# Patient Record
Sex: FEMALE | Race: WHITE | URBAN - METROPOLITAN AREA
[De-identification: names, ages, dates, MRNs, and addresses within clinical notes are randomized per-mention and may not be internally consistent; named-entity substitution may affect disease eponyms.]

---

## 2021-04-28 ENCOUNTER — APPOINTMENT (RX ONLY)
Dept: URBAN - METROPOLITAN AREA CLINIC 133 | Facility: CLINIC | Age: 66
Setting detail: DERMATOLOGY
End: 2021-04-28

## 2021-04-28 DIAGNOSIS — L57.0 ACTINIC KERATOSIS: ICD-10-CM

## 2021-04-28 DIAGNOSIS — L82.1 OTHER SEBORRHEIC KERATOSIS: ICD-10-CM

## 2021-04-28 DIAGNOSIS — L81.4 OTHER MELANIN HYPERPIGMENTATION: ICD-10-CM

## 2021-04-28 DIAGNOSIS — L91.8 OTHER HYPERTROPHIC DISORDERS OF THE SKIN: ICD-10-CM

## 2021-04-28 PROBLEM — D48.5 NEOPLASM OF UNCERTAIN BEHAVIOR OF SKIN: Status: ACTIVE | Noted: 2021-04-28

## 2021-04-28 PROCEDURE — ? ADDITIONAL NOTES

## 2021-04-28 PROCEDURE — 99203 OFFICE O/P NEW LOW 30 MIN: CPT | Mod: 25

## 2021-04-28 PROCEDURE — 11102 TANGNTL BX SKIN SINGLE LES: CPT | Mod: 59

## 2021-04-28 PROCEDURE — 11103 TANGNTL BX SKIN EA SEP/ADDL: CPT

## 2021-04-28 PROCEDURE — ? BIOPSY BY SHAVE METHOD

## 2021-04-28 PROCEDURE — 67810 INCAL BX EYELID SKN LID MRGN: CPT

## 2021-04-28 PROCEDURE — 17000 DESTRUCT PREMALG LESION: CPT | Mod: 59

## 2021-04-28 PROCEDURE — 17003 DESTRUCT PREMALG LES 2-14: CPT

## 2021-04-28 PROCEDURE — ? LIQUID NITROGEN

## 2021-04-28 PROCEDURE — ? COUNSELING

## 2021-04-28 ASSESSMENT — LOCATION SIMPLE DESCRIPTION DERM
LOCATION SIMPLE: CHEST
LOCATION SIMPLE: LEFT ANTERIOR NECK
LOCATION SIMPLE: LEFT UPPER ARM
LOCATION SIMPLE: RIGHT FOREARM
LOCATION SIMPLE: LEFT LATERAL SUPERIOR TARSAL REGION
LOCATION SIMPLE: RIGHT UPPER ARM

## 2021-04-28 ASSESSMENT — LOCATION DETAILED DESCRIPTION DERM
LOCATION DETAILED: LEFT ANTERIOR DISTAL UPPER ARM
LOCATION DETAILED: RIGHT ANTERIOR DISTAL UPPER ARM
LOCATION DETAILED: LEFT INFERIOR LATERAL NECK
LOCATION DETAILED: RIGHT MEDIAL SUPERIOR CHEST
LOCATION DETAILED: RIGHT VENTRAL PROXIMAL FOREARM
LOCATION DETAILED: LEFT ANTERIOR LATERAL DISTAL UPPER ARM
LOCATION DETAILED: RIGHT ANTECUBITAL SKIN
LOCATION DETAILED: LEFT MEDIAL SUPERIOR CHEST
LOCATION DETAILED: LEFT ANTECUBITAL SKIN
LOCATION DETAILED: RIGHT DISTAL DORSAL FOREARM
LOCATION DETAILED: LEFT LATERAL SUPERIOR TARSAL REGION

## 2021-04-28 ASSESSMENT — LOCATION ZONE DERM
LOCATION ZONE: EYELID
LOCATION ZONE: TRUNK
LOCATION ZONE: NECK
LOCATION ZONE: ARM

## 2021-04-28 NOTE — PROCEDURE: LIQUID NITROGEN
Render Post-Care Instructions In Note?: no
Consent: The patient's consent was obtained including but not limited to risks of crusting, scabbing, blistering, scarring, darker or lighter pigmentary change, recurrence, incomplete removal and infection.
Post-Care Instructions: I reviewed with the patient in detail post-care instructions. Patient is to wear sunprotection, and avoid picking at any of the treated lesions. Pt may apply Vaseline to crusted or scabbing areas.
Detail Level: Simple
Number Of Freeze-Thaw Cycles: 2 freeze-thaw cycles
Duration Of Freeze Thaw-Cycle (Seconds): 3

## 2021-04-28 NOTE — PROCEDURE: MIPS QUALITY
Detail Level: Generalized
Quality 226: Preventive Care And Screening: Tobacco Use: Screening And Cessation Intervention: Patient screened for tobacco use, is a smoker AND did not received Cessation Counseling for Medical Reasons
Quality 111:Pneumonia Vaccination Status For Older Adults: Pneumococcal Vaccination Previously Received
Quality 431: Preventive Care And Screening: Unhealthy Alcohol Use - Screening: Patient screened for unhealthy alcohol use using a single question and scores less than 2 times per year
Quality 130: Documentation Of Current Medications In The Medical Record: Current Medications Documented
Quality 110: Preventive Care And Screening: Influenza Immunization: Influenza Immunization Administered during Influenza season

## 2021-04-28 NOTE — PROCEDURE: BIOPSY BY SHAVE METHOD
Detail Level: Detailed
Depth Of Biopsy: dermis
Was A Bandage Applied: Yes
Size Of Lesion In Cm: 0
Biopsy Type: H and E
Biopsy Method: double edge Personna blade
Anesthesia Type: 1% lidocaine with epinephrine
Anesthesia Volume In Cc (Will Not Render If 0): 0.5
Hemostasis: Randall's
Wound Care: Polysporin ointment
Dressing: bandage
Destruction After The Procedure: No
Type Of Destruction Used: Curettage
Curettage Text: The wound bed was treated with curettage after the biopsy was performed.
Cryotherapy Text: The wound bed was treated with cryotherapy after the biopsy was performed.
Electrodesiccation Text: The wound bed was treated with electrodesiccation after the biopsy was performed.
Electrodesiccation And Curettage Text: The wound bed was treated with electrodesiccation and curettage after the biopsy was performed.
Silver Nitrate Text: The wound bed was treated with silver nitrate after the biopsy was performed.
Lab: [id number]
Lab Facility: [id number]
Consent: Written consent was obtained and risks were reviewed including but not limited to scarring, infection, bleeding, scabbing, incomplete removal, nerve damage and allergy to anesthesia.
Post-Care Instructions: I reviewed with the patient in detail post-care instructions. Patient is to keep the biopsy site dry overnight, and then apply polysporin twice daily until healed.
Notification Instructions: Patient will be notified of biopsy results. However, patient instructed to call the office if not contacted within 2 weeks.
Billing Type: Third-Party Bill
Information: Selecting Yes will display possible errors in your note based on the variables you have selected. This validation is only offered as a suggestion for you. PLEASE NOTE THAT THE VALIDATION TEXT WILL BE REMOVED WHEN YOU FINALIZE YOUR NOTE. IF YOU WANT TO FAX A PRELIMINARY NOTE YOU WILL NEED TO TOGGLE THIS TO 'NO' IF YOU DO NOT WANT IT IN YOUR FAXED NOTE.

## 2021-05-26 ENCOUNTER — APPOINTMENT (RX ONLY)
Dept: URBAN - METROPOLITAN AREA CLINIC 133 | Facility: CLINIC | Age: 66
Setting detail: DERMATOLOGY
End: 2021-05-26

## 2021-05-26 DIAGNOSIS — L91.8 OTHER HYPERTROPHIC DISORDERS OF THE SKIN: ICD-10-CM | Status: RESOLVED

## 2021-05-26 DIAGNOSIS — L40.8 OTHER PSORIASIS: ICD-10-CM

## 2021-05-26 DIAGNOSIS — L82.1 OTHER SEBORRHEIC KERATOSIS: ICD-10-CM

## 2021-05-26 PROBLEM — C44.629 SQUAMOUS CELL CARCINOMA OF SKIN OF LEFT UPPER LIMB, INCLUDING SHOULDER: Status: ACTIVE | Noted: 2021-05-26

## 2021-05-26 PROCEDURE — ? COUNSELING

## 2021-05-26 PROCEDURE — 99213 OFFICE O/P EST LOW 20 MIN: CPT | Mod: 25

## 2021-05-26 PROCEDURE — ? PRESCRIPTION

## 2021-05-26 PROCEDURE — 11603 EXC TR-EXT MAL+MARG 2.1-3 CM: CPT

## 2021-05-26 PROCEDURE — ? PATHOLOGY DISCUSSION

## 2021-05-26 PROCEDURE — ? ADDITIONAL NOTES

## 2021-05-26 PROCEDURE — ? EXCISION

## 2021-05-26 RX ORDER — TRIAMCINOLONE ACETONIDE 1 MG/G
CREAM TOPICAL
Qty: 1 | Refills: 1 | Status: ERX | COMMUNITY
Start: 2021-05-26

## 2021-05-26 RX ADMIN — TRIAMCINOLONE ACETONIDE: 1 CREAM TOPICAL at 00:00

## 2021-05-26 ASSESSMENT — LOCATION SIMPLE DESCRIPTION DERM
LOCATION SIMPLE: RIGHT POSTERIOR UPPER ARM
LOCATION SIMPLE: LEFT CALF

## 2021-05-26 ASSESSMENT — LOCATION DETAILED DESCRIPTION DERM
LOCATION DETAILED: LEFT PROXIMAL CALF
LOCATION DETAILED: RIGHT DISTAL POSTERIOR UPPER ARM

## 2021-05-26 ASSESSMENT — LOCATION ZONE DERM
LOCATION ZONE: LEG
LOCATION ZONE: ARM

## 2021-10-05 ENCOUNTER — APPOINTMENT (RX ONLY)
Dept: URBAN - METROPOLITAN AREA CLINIC 133 | Facility: CLINIC | Age: 66
Setting detail: DERMATOLOGY
End: 2021-10-05

## 2021-10-05 DIAGNOSIS — L85.3 XEROSIS CUTIS: ICD-10-CM

## 2021-10-05 DIAGNOSIS — L57.0 ACTINIC KERATOSIS: ICD-10-CM

## 2021-10-05 DIAGNOSIS — L82.1 OTHER SEBORRHEIC KERATOSIS: ICD-10-CM

## 2021-10-05 DIAGNOSIS — L81.4 OTHER MELANIN HYPERPIGMENTATION: ICD-10-CM

## 2021-10-05 PROBLEM — D48.5 NEOPLASM OF UNCERTAIN BEHAVIOR OF SKIN: Status: ACTIVE | Noted: 2021-10-05

## 2021-10-05 PROCEDURE — 17000 DESTRUCT PREMALG LESION: CPT | Mod: 59

## 2021-10-05 PROCEDURE — 11102 TANGNTL BX SKIN SINGLE LES: CPT

## 2021-10-05 PROCEDURE — 17003 DESTRUCT PREMALG LES 2-14: CPT

## 2021-10-05 PROCEDURE — ? COUNSELING

## 2021-10-05 PROCEDURE — 99213 OFFICE O/P EST LOW 20 MIN: CPT | Mod: 25

## 2021-10-05 PROCEDURE — ? LIQUID NITROGEN

## 2021-10-05 PROCEDURE — ? ADDITIONAL NOTES

## 2021-10-05 PROCEDURE — ? BIOPSY BY SHAVE METHOD

## 2021-10-05 ASSESSMENT — LOCATION SIMPLE DESCRIPTION DERM
LOCATION SIMPLE: LEFT FOREARM
LOCATION SIMPLE: LEFT FOREHEAD
LOCATION SIMPLE: LEFT PRETIBIAL REGION
LOCATION SIMPLE: RIGHT PRETIBIAL REGION
LOCATION SIMPLE: LEFT CHEEK

## 2021-10-05 ASSESSMENT — LOCATION DETAILED DESCRIPTION DERM
LOCATION DETAILED: LEFT PROXIMAL PRETIBIAL REGION
LOCATION DETAILED: RIGHT PROXIMAL PRETIBIAL REGION
LOCATION DETAILED: LEFT INFERIOR FOREHEAD
LOCATION DETAILED: LEFT FOREHEAD
LOCATION DETAILED: LEFT SUPERIOR CENTRAL MALAR CHEEK
LOCATION DETAILED: LEFT PROXIMAL DORSAL FOREARM

## 2021-10-05 ASSESSMENT — LOCATION ZONE DERM
LOCATION ZONE: LEG
LOCATION ZONE: ARM
LOCATION ZONE: FACE

## 2021-10-05 NOTE — PROCEDURE: LIQUID NITROGEN
Detail Level: Simple
Render Post-Care Instructions In Note?: no
Show Aperture Variable?: Yes
Consent: The patient's consent was obtained including but not limited to risks of crusting, scabbing, blistering, scarring, darker or lighter pigmentary change, recurrence, incomplete removal and infection.
Number Of Freeze-Thaw Cycles: 2 freeze-thaw cycles
Post-Care Instructions: I reviewed with the patient in detail post-care instructions. Patient is to wear sunprotection, and avoid picking at any of the treated lesions. Pt may apply Vaseline to crusted or scabbing areas.
Duration Of Freeze Thaw-Cycle (Seconds): 3

## 2021-11-02 ENCOUNTER — APPOINTMENT (RX ONLY)
Dept: URBAN - METROPOLITAN AREA CLINIC 133 | Facility: CLINIC | Age: 66
Setting detail: DERMATOLOGY
End: 2021-11-02

## 2021-11-02 PROBLEM — C44.729 SQUAMOUS CELL CARCINOMA OF SKIN OF LEFT LOWER LIMB, INCLUDING HIP: Status: ACTIVE | Noted: 2021-11-02

## 2021-11-02 PROCEDURE — 99213 OFFICE O/P EST LOW 20 MIN: CPT

## 2021-11-02 PROCEDURE — ? PATHOLOGY DISCUSSION

## 2021-11-02 PROCEDURE — ? CONSULTATION FOR MOHS SURGERY

## 2021-11-02 PROCEDURE — ? ADDITIONAL NOTES

## 2021-11-02 PROCEDURE — ? COUNSELING

## 2021-11-02 NOTE — PROCEDURE: CONSULTATION FOR MOHS SURGERY
Detail Level: Detailed
Size Of Lesion: 2.2
X Size Of Lesion In Cm (Optional): 1.9
Incorporate Mauc In Note: Yes

## 2021-11-29 ENCOUNTER — APPOINTMENT (RX ONLY)
Dept: URBAN - METROPOLITAN AREA CLINIC 133 | Facility: CLINIC | Age: 66
Setting detail: DERMATOLOGY
End: 2021-11-29

## 2021-11-29 VITALS — HEART RATE: 83 BPM | DIASTOLIC BLOOD PRESSURE: 81 MMHG | SYSTOLIC BLOOD PRESSURE: 162 MMHG

## 2021-11-29 PROBLEM — C44.729 SQUAMOUS CELL CARCINOMA OF SKIN OF LEFT LOWER LIMB, INCLUDING HIP: Status: ACTIVE | Noted: 2021-11-29

## 2021-11-29 PROCEDURE — ? MOHS SURGERY

## 2021-11-29 PROCEDURE — 17313 MOHS 1 STAGE T/A/L: CPT

## 2021-11-29 PROCEDURE — 17314 MOHS ADDL STAGE T/A/L: CPT

## 2021-11-29 PROCEDURE — ? PRESCRIPTION

## 2021-11-29 PROCEDURE — 15220 FTH/GFT FR S/A/L 20 SQ CM/<: CPT

## 2021-11-29 RX ORDER — GENTAMICIN SULFATE 1 MG/G
OINTMENT TOPICAL
Qty: 15 | Refills: 0 | Status: ERX | COMMUNITY
Start: 2021-11-29

## 2021-11-29 RX ADMIN — GENTAMICIN SULFATE: 1 OINTMENT TOPICAL at 00:00

## 2021-11-29 NOTE — PROCEDURE: MOHS SURGERY

## 2021-12-23 ENCOUNTER — APPOINTMENT (RX ONLY)
Dept: URBAN - METROPOLITAN AREA CLINIC 133 | Facility: CLINIC | Age: 66
Setting detail: DERMATOLOGY
End: 2021-12-23

## 2021-12-23 DIAGNOSIS — Z48.817 ENCOUNTER FOR SURGICAL AFTERCARE FOLLOWING SURGERY ON THE SKIN AND SUBCUTANEOUS TISSUE: ICD-10-CM

## 2021-12-23 PROCEDURE — 99024 POSTOP FOLLOW-UP VISIT: CPT

## 2021-12-23 PROCEDURE — ? ADDITIONAL NOTES

## 2021-12-23 PROCEDURE — ? POST-OP WOUND CHECK

## 2021-12-23 ASSESSMENT — LOCATION ZONE DERM: LOCATION ZONE: LEG

## 2021-12-23 ASSESSMENT — LOCATION DETAILED DESCRIPTION DERM: LOCATION DETAILED: LEFT PROXIMAL LATERAL PRETIBIAL REGION

## 2021-12-23 ASSESSMENT — LOCATION SIMPLE DESCRIPTION DERM: LOCATION SIMPLE: LEFT LOWER LEG

## 2021-12-23 NOTE — PROCEDURE: POST-OP WOUND CHECK
Detail Level: Detailed
Add 03832 Cpt? (Important Note: In 2017 The Use Of 71590 Is Being Tracked By Cms To Determine Future Global Period Reimbursement For Global Periods): yes
Wound Evaluated By: Aneta Alford

## 2023-06-16 NOTE — PROCEDURE: EXCISION
Home Dorsal Nasal Flap Text: The defect edges were debeveled with a #15 scalpel blade.  Given the location of the defect and the proximity to free margins a dorsal nasal flap was deemed most appropriate.  Using a sterile surgical marker, an appropriate dorsal nasal flap was drawn around the defect.    The area thus outlined was incised deep to adipose tissue with a #15 scalpel blade.  The skin margins were undermined to an appropriate distance in all directions utilizing iris scissors.

## 2024-12-12 NOTE — PROCEDURE: EXCISION
Plan: We discussed the fact she is being immunosuppressed being on both methotrexate and Cosentyx.  We discussed increasing the frequency of the Cosentyx from every 4 weeks to every 2 weeks may be an option should she continue to see lack of improvement on current schedule however, this may further increase her risk of infection/malignancy thus I DO NOT advise this at this time.  My goal is to find her TX options  that addresses her RA and her HS with the highest safety profile while decreasing her risk of immunosuppression however, all of these medication classes will carry significant risks and multiple medications/classes may be necessary.  This will need to be accomplished in coordination with her rheumatologist and her PCP.  She most certainly would benefit from smoking cessation.\\n\\nI reassured Janny she has been on Cosentyx therapy for less than 3 months now and clinical trials continue to show improvement well beyond the 3 month window of initiation.  We discussed the etiology of HS and slow response to TX with this condition.   She denies worrisome SFX and relays being UTD on her cancer screenings.  I thus agreed to continue the Cosentyx at this time.  \\n\\nShe is encouraged to continue spironolactone with Cosentyx, Hibilens topical cleanser with bathing.  She may apply clindamycin topical twice daily during times of flares.  \\n\\nI would like to see her off both MTX and minimal use of prednisone all together as these medications alone are more immunpsuppressing vs Cosentyx alone but in combination with a biologic, all three increase her risk of both cancer and infection which we discussed.  She would like to continue on the Cosentyx knowing this risk and is planning to discuss altering her TX plan with Dr Eller.  She relays her RA is mainly under control per her verbalization.\\n\\nI additionally briefly discussed KASSANDRA inhibitors, specifically Rinvoq being in phase III clinical trials for HS. Rinvoq is also FDA approved for RA and may be a good option for her in the future for bother her RA and HS management,  However, she is smoker and relays having issues with hyperlipidemia for which I have not seen these labs.  Rinvoq like other KASSANDRA-I can increase risk of blood clots and elevated lipids.  She is encouraged to stop smoking and seek  with her PCP for assistance on smoking cessation to open up her TX options for these challenging autoimmune and inflammatory conditions. Detail Level: Simple Render In Strict Bullet Format?: No Continue Regimen: Clindamycin as directed.\\nSpironolactone 100mg - take two tablet daily\\n\\nCosentyx UnoReady Pen - Inject 300mg/2mL SC every 4 weeks\\n\\nContinue with hibiclens. Complex Repair And O-L Flap Text: The defect edges were debeveled with a #15 scalpel blade.  The primary defect was closed partially with a complex linear closure.  Given the location of the remaining defect, shape of the defect and the proximity to free margins an O-L flap was deemed most appropriate for complete closure of the defect.  Using a sterile surgical marker, an appropriate flap was drawn incorporating the defect and placing the expected incisions within the relaxed skin tension lines where possible.    The area thus outlined was incised deep to adipose tissue with a #15 scalpel blade.  The skin margins were undermined to an appropriate distance in all directions utilizing iris scissors.